# Patient Record
Sex: FEMALE | Race: WHITE | NOT HISPANIC OR LATINO | URBAN - METROPOLITAN AREA
[De-identification: names, ages, dates, MRNs, and addresses within clinical notes are randomized per-mention and may not be internally consistent; named-entity substitution may affect disease eponyms.]

---

## 2024-08-17 ENCOUNTER — EMERGENCY (EMERGENCY)
Facility: HOSPITAL | Age: 29
LOS: 1 days | Discharge: HOME | End: 2024-08-17
Attending: EMERGENCY MEDICINE | Admitting: EMERGENCY MEDICINE
Payer: COMMERCIAL

## 2024-08-17 VITALS
WEIGHT: 209.44 LBS | RESPIRATION RATE: 16 BRPM | OXYGEN SATURATION: 98 % | SYSTOLIC BLOOD PRESSURE: 146 MMHG | TEMPERATURE: 99 F | HEART RATE: 65 BPM | HEIGHT: 69.29 IN | DIASTOLIC BLOOD PRESSURE: 95 MMHG
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
## 2024-08-17 DIAGNOSIS — Z88.8 ALLERGY STATUS TO OTHER DRUGS, MEDICAMENTS AND BIOLOGICAL SUBSTANCES: ICD-10-CM

## 2024-08-17 DIAGNOSIS — M25.472 EFFUSION, LEFT ANKLE: ICD-10-CM
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
## 2024-08-17 DIAGNOSIS — S82.892A OTHER FRACTURE OF LEFT LOWER LEG, INITIAL ENCOUNTER FOR CLOSED FRACTURE: ICD-10-CM

## 2024-08-17 PROCEDURE — 73700 CT LOWER EXTREMITY W/O DYE: CPT | Mod: 26,LT,MC

## 2024-08-17 PROCEDURE — 99284 EMERGENCY DEPT VISIT MOD MDM: CPT

## 2024-08-17 NOTE — ED PROVIDER NOTE - NSFOLLOWUPINSTRUCTIONS_ED_ALL_ED_FT
Follow up with orthopedics ASAP.  Do not bear weight on the left leg until you have seen the orthopedist.    Ankle Fracture  The ankle and foot showing the tibia, fibula, and talus.  An ankle fracture is a break in one, two, or all three of the bones in your ankle joint. The ankle joint is made up of:  The lower parts of your tibia and fibula. These are the bones in your lower leg.  A bone in the foot called the talus.  There are two types of ankle fractures:  Stable fracture. This happens when one of the bones is broken, but the bones of the ankle joint stay in their normal positions.  Unstable fracture. This type:  Can include more than one broken bone.  Can happen if the outer bone is broken and the ligaments of the inner ankle are also injured. Ligaments are tissues that connect bones to each other.  Lets the talus move out of its normal position.  What are the causes?  A hard, direct hit to the ankle.  Twisting your ankle fast and very badly.  Getting injured in a car crash or from a fall from a high place.  What increases the risk?  Being overweight.  Doing sports such as soccer, gymnastics, or football.  What are the signs or symptoms?  A tender and swollen ankle.  Bruising around your ankle.  Pain when you move or press on your ankle.  Trouble walking.  Not being able to use your ankle to support your body weight, or not being able to put weight on your ankle.  Pain that gets worse when you move your ankle or foot, or when you stand.  Pain that gets better with rest.  How is this diagnosed?  An ankle fracture is usually diagnosed with a physical exam and X-rays.    You may also have a CT scan or an MRI.    How is this treated?  Treatment depends on the type of ankle fracture you have.    Stable fractures are treated with:  A cast, boot, or splint to hold the ankle still.  Using crutches until the fracture heals. You will not be able to put weight on your ankle until it heals.  Unstable fractures are treated with:  Surgery. You will not be able to put weight on your injured ankle for several weeks.  After surgery, you will have a splint. After your cut from surgery heals, your surgeon may give you a cast or a boot.  Using crutches until the fracture heals. You will not be able to put weight on your ankle for several weeks.  After your ankle has healed, you'll do physical therapy exercises to help your ankle move better and get stronger.    Follow these instructions at home:  If you have a boot or splint that can be taken off:    Wear the boot or splint as told by your health care provider. Take it off only if your provider says you can.  Check the skin around it every day. Tell your provider if you see problems.  Loosen the boot or splint if your toes tingle, are numb, or turn cold and blue.  Keep the boot or splint clean and dry.  If you have a cast that cannot be taken off:    Do not put pressure on any part of the cast until it's hard. This may take a few hours.  Do not stick anything inside it to scratch your skin. Doing that raises your risk of infection.  Check the skin around the cast every day. Tell your provider if you see problems.  You may put lotion on dry skin around the cast. Do not put lotion on the skin underneath the cast.  Keep the cast clean and dry.  Bathing    Do not take baths, swim, or use a hot tub until told. Ask if showers are okay. You may need to take sponge baths only.  If the cast, boot, or splint is not waterproof:  Do not let it get wet.  Cover it when you take a bath or shower. Use a cover that does not let any water in.  Managing pain, stiffness, and swelling    Bag of ice on a towel on the skin.  If told, put ice on the area.  If you have a splint or boot that you can take off, remove it as told.  Put ice in a plastic bag.  Place a towel between your skin and the bag or between your cast and the bag.  Leave the ice on for 20 minutes, 2–3 times a day.  If your skin turns bright red, take off the ice right away to prevent skin damage. The risk of damage is higher if you can't feel pain, heat, or cold.  Move your toes often to reduce stiffness and swelling.  Raise the injured area above the level of your heart while you are sitting or lying down. Use a pillow to support your foot as needed.  Activity    Return to normal activities when you are told. Ask what things are safe for you to do.  Do not stand or walk on your injured ankle until you're told it's okay. Use crutches as told.  Do exercises as told by your provider.  General instructions    Take your medicines only as told by your provider.  Ask when it's safe to drive if you have a cast, boot, or splint on your ankle.  Do not smoke, vape, or use products with nicotine or tobacco in them. These can make healing take longer after surgery. If you need help quitting, talk with your provider.  Keep all follow-up visits. Your provider will need to check how your ankle is healing.  Contact a health care provider if:  You have pain or swelling that gets worse.  Your pain or swelling does not get better with rest or medicine.  Your cast gets damaged.  Get help right away if:  You develop new pain or swelling.  Your skin or toes below the injured ankle:  Turn blue or gray.  Feel cold.  Lose feeling.  Have less feeling than normal when something touches them.  This information is not intended to replace advice given to you by your health care provider. Make sure you discuss any questions you have with your health care provider.

## 2024-08-17 NOTE — ED PROVIDER NOTE - PHYSICAL EXAMINATION
Constitutional: awake and alert, in no acute distress  HEENT: head normocephalic and atraumatic. moist mucous membranes  Eyes: extraocular movements intact, normal conjunctiva  Neck: supple, normal ROM  Cardiovascular: regular rate   Pulmonary: no respiratory distress  Gastrointestinal: abdomen flat and nondistended  Skin: warm, dry, normal for ethnicity  Musculoskeletal: L ankle swelling and TTP  Neurological: oriented x4, no focal neurologic deficit.   Psychiatric: calm and cooperative

## 2024-08-17 NOTE — ED PROVIDER NOTE - OBJECTIVE STATEMENT
28-year-old female with no significant past medical history presents with left ankle swelling and pain after twisting her ankle on a hike yesterday in [place].  Patient states that she was able to continue hiking for approximately 3 hours despite her ankle pain.  Patient then flew to [place] today, had worsening ankle pain and swelling, so went to urgent care, where she had an x-ray which showed a posterior malleolar fracture, and patient was told to come immediately to the ED.  No chest pain or shortness of breath.  No calf pain or swelling.

## 2024-08-17 NOTE — ED ADULT TRIAGE NOTE - CHIEF COMPLAINT QUOTE
Pt. walk in from urgent care for Fx L. posterior malleolus from jumping off a rock and landing wrong yesterday.

## 2024-08-17 NOTE — ED PROVIDER NOTE - PATIENT PORTAL LINK FT
You can access the FollowMyHealth Patient Portal offered by [facility] by registering at the following website: [email or web address]. By joining [employer]’s FollowMyHealth portal, you will also be able to view your health information using other applications (apps) compatible with our system.

## 2024-08-17 NOTE — ED PROVIDER NOTE - CLINICAL SUMMARY MEDICAL DECISION MAKING FREE TEXT BOX
Patient with no significant past medical history presents with left ankle posterior malleolus fracture found on x-ray at urgent care tonight after mechanical inversion injury yesterday.  On exam, patient has left ankle swelling and tenderness to palpation.  Patient is neurovascularly intact.  X-rays from urgent care reviewed by orthopedist Dr. Galindo, who recommends CT scan for better fracture delineation and outpatient follow up.  Will get CT and place pt in CAM boot.  Pt was given crutches at urgent care.

## 2024-08-17 NOTE — ED PROVIDER NOTE - CARE PROVIDER_API CALL
Kaden Galindo  Orthopaedic Surgery  [address]  [place] [address]  Phone: [phone]  Fax: [phone]  Follow Up Time: 1-3 Days

## 2024-08-18 NOTE — ED ADULT NURSE NOTE - NSFALLRISKINTERV_ED_ALL_ED
EFFEXOR 50MG      Last Written Prescription Date:  9-19-23  Last Fill Quantity: 90,   # refills: 0  Last Office Visit: 6-29-23  Future Office visit:    Next 5 appointments (look out 90 days)      Dec 29, 2023  9:00 AM  (Arrive by 8:45 AM)  SHORT with Kaila Henry NP  [facility] ([facility] ) [address]  [facility] [address]  [phone]             Routing refill request to provider for review/approval because:    
Communicate fall risk and risk factors to all staff, patient, and family/Provide visual cue: yellow wristband, yellow gown, etc/Reinforce activity limits and safety measures with patient and family/Call bell, personal items and telephone in reach/Instruct patient to call for assistance before getting out of bed/chair/stretcher/Non-slip footwear applied when patient is off stretcher/[place] to call system/Physically safe environment - no spills, clutter or unnecessary equipment/Purposeful Proactive Rounding/Room/bathroom lighting operational, light cord in reach

## 2024-08-30 PROBLEM — Z00.00 ENCOUNTER FOR PREVENTIVE HEALTH EXAMINATION: Status: ACTIVE | Noted: 2024-08-30

## 2024-09-03 ENCOUNTER — APPOINTMENT (OUTPATIENT)
Dept: ORTHOPEDIC SURGERY | Age: 29
End: 2024-09-03
Payer: COMMERCIAL

## 2024-09-03 VITALS
WEIGHT: 205 LBS | BODY MASS INDEX: 30.36 KG/M2 | HEIGHT: 69 IN | SYSTOLIC BLOOD PRESSURE: 122 MMHG | HEART RATE: 61 BPM | DIASTOLIC BLOOD PRESSURE: 80 MMHG | OXYGEN SATURATION: 97 %

## 2024-09-03 DIAGNOSIS — Z78.9 OTHER SPECIFIED HEALTH STATUS: ICD-10-CM

## 2024-09-03 DIAGNOSIS — S82.302A UNSPECIFIED FRACTURE OF LOWER END OF LEFT TIBIA, INITIAL ENCOUNTER FOR CLOSED FRACTURE: ICD-10-CM

## 2024-09-03 PROCEDURE — 99204 OFFICE O/P NEW MOD 45 MIN: CPT

## 2024-09-06 NOTE — DISCUSSION/SUMMARY

## 2024-09-06 NOTE — PHYSICAL EXAM
[de-identified] : General: Well-nourished, well-developed, alert, and in no acute distress. Head: Normocephalic. Eyes: Pupils equal, extraocular muscles intact, normal sclera. Nose: No nasal discharge. Cardiovascular: Extremities are warm and well perfused. Distal pulses are symmetric bilaterally. Respiratory: No labored breathing. Extremities: Sensation is intact distally bilaterally. Distal pulses are symmetric bilaterally Lymphatic: No regional lymphadenopathy, no lymphedema Neurologic: No focal deficits Skin: Normal skin color, texture, and turgor Psychiatric: Normal affect  Examination of left ankle Ambulating with tall CAM boot and crutches Inspection: mild effusion. Tender to palpation: medial malleolus, posterior tibia Nontender to palpation:  lateral malleolus, base of 5th metatarsal, navicular, ATFL, CFL, PTFL, AITFL, Achilles tendon, PT, FHL, tibialis anterior, peroneals, plantar fascia   ROM: Plantar flexion 45 deg, dorsiflexion 20 deg, inversion 20 deg, eversion 20 deg Special Tests: Anterior Drawer (ATFL): negative for pain and laxity Talar Tilt (CFL): negative for pain and laxity Kleigers (ext rot): negative for pain and laxity at deltoid ligament or distal fibula Squeeze test: negative at proximal or distal syndesmosis Mai test negative Hillary negative   Sensation is intact to light touch over the superficial and deep peroneal nerve distributions and the posterior tibial nerve distribution. Capillary refill is less than two seconds. Posterior tibial and dorsalis pedis pulses 2+ equal bilaterally. No calf swelling or tenderness bilaterally. Strength testing shows 5/5 Quads, 5/5 Hamstrings, 5/5 EHL, 5/5 FHL, 5/5 tibialis anterior, 5/5 gastroc-soleus complex. Reflexes: Patellar 2+, Achilles 2+. [de-identified] : CT ANKLE ONLY LT obtained at [facility] on 08/17/2024 was reviewed with the patient demonstrating: Acute nondisplaced coronally oriented fracture of the posterior aspect of the distal tibia

## 2024-09-06 NOTE — PHYSICAL EXAM
[de-identified] : General: Well-nourished, well-developed, alert, and in no acute distress. Head: Normocephalic. Eyes: Pupils equal, extraocular muscles intact, normal sclera. Nose: No nasal discharge. Cardiovascular: Extremities are warm and well perfused. Distal pulses are symmetric bilaterally. Respiratory: No labored breathing. Extremities: Sensation is intact distally bilaterally. Distal pulses are symmetric bilaterally Lymphatic: No regional lymphadenopathy, no lymphedema Neurologic: No focal deficits Skin: Normal skin color, texture, and turgor Psychiatric: Normal affect  Examination of left ankle Ambulating with tall CAM boot and crutches Inspection: mild effusion. Tender to palpation: medial malleolus, posterior tibia Nontender to palpation:  lateral malleolus, base of 5th metatarsal, navicular, ATFL, CFL, PTFL, AITFL, Achilles tendon, PT, FHL, tibialis anterior, peroneals, plantar fascia   ROM: Plantar flexion 45 deg, dorsiflexion 20 deg, inversion 20 deg, eversion 20 deg Special Tests: Anterior Drawer (ATFL): negative for pain and laxity Talar Tilt (CFL): negative for pain and laxity Kleigers (ext rot): negative for pain and laxity at deltoid ligament or distal fibula Squeeze test: negative at proximal or distal syndesmosis Mai test negative Hillary negative   Sensation is intact to light touch over the superficial and deep peroneal nerve distributions and the posterior tibial nerve distribution. Capillary refill is less than two seconds. Posterior tibial and dorsalis pedis pulses 2+ equal bilaterally. No calf swelling or tenderness bilaterally. Strength testing shows 5/5 Quads, 5/5 Hamstrings, 5/5 EHL, 5/5 FHL, 5/5 tibialis anterior, 5/5 gastroc-soleus complex. Reflexes: Patellar 2+, Achilles 2+. [de-identified] : CT ANKLE ONLY LT obtained at [facility] on 08/17/2024 was reviewed with the patient demonstrating: Acute nondisplaced coronally oriented fracture of the posterior aspect of the distal tibia

## 2024-09-06 NOTE — DISCUSSION/SUMMARY

## 2024-09-06 NOTE — HISTORY OF PRESENT ILLNESS
[de-identified] : VINEET SPAIN is a 28-year-old female presents today for left ankle pain.  Was hiking in [facility] outside of [place].  Pain started august 16, she was hiking, twisted ankle hit stone and fell. She is ambulating with crutches and using a brace. She went to [facility] and was told to rest and use crutches. Saw Orthopaedic Surgeon Nabil Brooks who advised continue with CAM boot and follow up in 4 weeks. She reports she had swelling in the beginning when it happened which has gone down now.  Pain is around the arch as well as the frontal aspect of the foot.  She confirms tingling. Pt. used ice, ibuprofen as prior treatment to help treat the pain.  Exercises regularly. Would like to run [place] marathon in 2025. Employment: [employer]

## 2024-09-06 NOTE — PHYSICAL EXAM
[de-identified] : General: Well-nourished, well-developed, alert, and in no acute distress. Head: Normocephalic. Eyes: Pupils equal, extraocular muscles intact, normal sclera. Nose: No nasal discharge. Cardiovascular: Extremities are warm and well perfused. Distal pulses are symmetric bilaterally. Respiratory: No labored breathing. Extremities: Sensation is intact distally bilaterally. Distal pulses are symmetric bilaterally Lymphatic: No regional lymphadenopathy, no lymphedema Neurologic: No focal deficits Skin: Normal skin color, texture, and turgor Psychiatric: Normal affect  Examination of left ankle Ambulating with tall CAM boot and crutches Inspection: mild effusion. Tender to palpation: medial malleolus, posterior tibia Nontender to palpation:  lateral malleolus, base of 5th metatarsal, navicular, ATFL, CFL, PTFL, AITFL, Achilles tendon, PT, FHL, tibialis anterior, peroneals, plantar fascia   ROM: Plantar flexion 45 deg, dorsiflexion 20 deg, inversion 20 deg, eversion 20 deg Special Tests: Anterior Drawer (ATFL): negative for pain and laxity Talar Tilt (CFL): negative for pain and laxity Kleigers (ext rot): negative for pain and laxity at deltoid ligament or distal fibula Squeeze test: negative at proximal or distal syndesmosis Mai test negative Hillary negative   Sensation is intact to light touch over the superficial and deep peroneal nerve distributions and the posterior tibial nerve distribution. Capillary refill is less than two seconds. Posterior tibial and dorsalis pedis pulses 2+ equal bilaterally. No calf swelling or tenderness bilaterally. Strength testing shows 5/5 Quads, 5/5 Hamstrings, 5/5 EHL, 5/5 FHL, 5/5 tibialis anterior, 5/5 gastroc-soleus complex. Reflexes: Patellar 2+, Achilles 2+. [de-identified] : CT ANKLE ONLY LT obtained at [facility] on 08/17/2024 was reviewed with the patient demonstrating: Acute nondisplaced coronally oriented fracture of the posterior aspect of the distal tibia

## 2024-09-06 NOTE — ASSESSMENT
[FreeTextEntry1] : VINEET SPAIN is a 28 year old female with left ankle pain. I discussed with the patient that their symptoms, signs, and imaging are most consistent with nondisplaced distal tibia fracture.  We reviewed the natural history of this condition and treatment options. We agreed on the following plan:   XR and CT reviewed today. c/w tall CAM boot. Instructions on use provided.  Has ankle scooter. Encouraged to use. Follow up in 5-6 weeks for repeat X-ray.

## 2024-09-06 NOTE — DISCUSSION/SUMMARY

## 2024-09-06 NOTE — HISTORY OF PRESENT ILLNESS
[de-identified] : VINEET SPAIN is a 28-year-old female presents today for left ankle pain.  Was hiking in [facility] outside of [place].  Pain started august 16, she was hiking, twisted ankle hit stone and fell. She is ambulating with crutches and using a brace. She went to [facility] and was told to rest and use crutches. Saw Orthopaedic Surgeon Nabil Brooks who advised continue with CAM boot and follow up in 4 weeks. She reports she had swelling in the beginning when it happened which has gone down now.  Pain is around the arch as well as the frontal aspect of the foot.  She confirms tingling. Pt. used ice, ibuprofen as prior treatment to help treat the pain.  Exercises regularly. Would like to run [place] marathon in 2025. Employment: [employer]

## 2024-09-06 NOTE — HISTORY OF PRESENT ILLNESS
[de-identified] : VINEET SPAIN is a 28-year-old female presents today for left ankle pain.  Was hiking in [facility] outside of [place].  Pain started august 16, she was hiking, twisted ankle hit stone and fell. She is ambulating with crutches and using a brace. She went to [facility] and was told to rest and use crutches. Saw Orthopaedic Surgeon Nabil Brooks who advised continue with CAM boot and follow up in 4 weeks. She reports she had swelling in the beginning when it happened which has gone down now.  Pain is around the arch as well as the frontal aspect of the foot.  She confirms tingling. Pt. used ice, ibuprofen as prior treatment to help treat the pain.  Exercises regularly. Would like to run [place] marathon in 2025. Employment: [employer]

## 2024-09-12 ENCOUNTER — APPOINTMENT (OUTPATIENT)
Dept: RADIOLOGY | Facility: CLINIC | Age: 29
End: 2024-09-12

## 2024-10-22 ENCOUNTER — APPOINTMENT (OUTPATIENT)
Dept: ORTHOPEDIC SURGERY | Age: 29
End: 2024-10-22
